# Patient Record
Sex: FEMALE | Race: WHITE | NOT HISPANIC OR LATINO | Employment: STUDENT | URBAN - METROPOLITAN AREA
[De-identification: names, ages, dates, MRNs, and addresses within clinical notes are randomized per-mention and may not be internally consistent; named-entity substitution may affect disease eponyms.]

---
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
## 2025-03-16 ENCOUNTER — OFFICE VISIT (OUTPATIENT)
Dept: URGENT CARE | Facility: URGENT CARE | Age: 20
End: 2025-03-16
Payer: MEDICARE

## 2025-03-16 VITALS
BODY MASS INDEX: 20.62 KG/M2 | HEIGHT: 67 IN | TEMPERATURE: 98.1 F | WEIGHT: 131.39 LBS | RESPIRATION RATE: 16 BRPM | HEART RATE: 100 BPM | OXYGEN SATURATION: 100 % | SYSTOLIC BLOOD PRESSURE: 122 MMHG | DIASTOLIC BLOOD PRESSURE: 78 MMHG

## 2025-03-16 DIAGNOSIS — B37.31 VAGINAL CANDIDIASIS: ICD-10-CM

## 2025-03-16 DIAGNOSIS — R35.0 FREQUENT URINATION: Primary | ICD-10-CM

## 2025-03-16 LAB
POC APPEARANCE, URINE: CLEAR
POC BILIRUBIN, URINE: NEGATIVE
POC BLOOD, URINE: NEGATIVE
POC COLOR, URINE: YELLOW
POC GLUCOSE, URINE: NEGATIVE MG/DL
POC KETONES, URINE: NEGATIVE MG/DL
POC LEUKOCYTES, URINE: NEGATIVE
POC NITRITE,URINE: NEGATIVE
POC PH, URINE: 6.5 PH
POC PROTEIN, URINE: NEGATIVE MG/DL
POC SPECIFIC GRAVITY, URINE: 1.01
POC UROBILINOGEN, URINE: 0.2 EU/DL
PREGNANCY TEST URINE, POC: NEGATIVE

## 2025-03-16 PROCEDURE — 81003 URINALYSIS AUTO W/O SCOPE: CPT | Performed by: NURSE PRACTITIONER

## 2025-03-16 PROCEDURE — 99204 OFFICE O/P NEW MOD 45 MIN: CPT | Performed by: NURSE PRACTITIONER

## 2025-03-16 PROCEDURE — 3008F BODY MASS INDEX DOCD: CPT | Performed by: NURSE PRACTITIONER

## 2025-03-16 PROCEDURE — 1036F TOBACCO NON-USER: CPT | Performed by: NURSE PRACTITIONER

## 2025-03-16 PROCEDURE — 81025 URINE PREGNANCY TEST: CPT | Performed by: NURSE PRACTITIONER

## 2025-03-16 RX ORDER — METHYLPHENIDATE HYDROCHLORIDE 36 MG/1
1 TABLET ORAL
COMMUNITY
Start: 2025-03-13

## 2025-03-16 RX ORDER — FLUCONAZOLE 150 MG/1
150 TABLET ORAL ONCE
Qty: 1 TABLET | Refills: 0 | Status: SHIPPED | OUTPATIENT
Start: 2025-03-16 | End: 2025-03-16

## 2025-03-16 ASSESSMENT — ENCOUNTER SYMPTOMS
HEMATURIA: 0
DIAPHORESIS: 0
BACK PAIN: 0
APPETITE CHANGE: 0
CONFUSION: 0
FLANK PAIN: 0
CHEST TIGHTNESS: 0
ACTIVITY CHANGE: 0
MYALGIAS: 0
NAUSEA: 0
NECK STIFFNESS: 0
LIGHT-HEADEDNESS: 0
HEADACHES: 0
FREQUENCY: 0
DIFFICULTY URINATING: 0
WHEEZING: 0
CHILLS: 0
DYSURIA: 0
NECK PAIN: 0
VOMITING: 0
COUGH: 0
DIZZINESS: 0
FATIGUE: 0
DIARRHEA: 0
ABDOMINAL DISTENTION: 0
FEVER: 0
ABDOMINAL PAIN: 0
PALPITATIONS: 0
SHORTNESS OF BREATH: 0
BLOOD IN STOOL: 0

## 2025-03-16 NOTE — PROGRESS NOTES
"Subjective   Patient ID: Padmaja Alex is a 20 y.o. female. They present today with a chief complaint of No chief complaint on file..    History of Present Illness  Chief complaint: Complaints of possible abscess near the rectal area with vaginal itching externally.  \"Feels like her whole body is swelling\".    HPI: Chart reviewed patient has a history of session with major depressive disorder with severe anxiety and ADHD.  Having said that patient thinks she has an abscess near her rectal area.  Onset 2 days ago.  Also states she feels like    Whole body from head to toe with swelling intermittently over the last 2 days.  Denies for me nausea vomiting or diarrhea.  Told nurse that she has some eye complaints.  No vision changes vision within normal limits.  No pain in the eyes no redness.  No eye injuries.  No abdominal pain no flank pain.  Reports normal intake and output.  No new ingestions or exposures.  No trouble breathing or swallowing no chest pain or shortness of breath.  Appears very anxious.        Nurses notes, vitals and old chart reviewed      History provided by:  Patient   used: No        Past Medical History  Allergies as of 03/16/2025    (No Known Allergies)       (Not in a hospital admission)       Past Medical History:   Diagnosis Date    Absolute anemia 01/23/2023    Cellulitis of left lower limb 12/16/2019    Cellulitis of left lower extremity    Dysautonomia (Multi) 09/24/2022    Microcytic hypochromic anemia 03/02/2022       Past Surgical History:   Procedure Laterality Date    OTHER SURGICAL HISTORY  01/12/2021    No history of surgery            Review of Systems  Review of Systems   Constitutional:  Negative for activity change, appetite change, chills, diaphoresis, fatigue and fever.   HENT: Negative.     Respiratory:  Negative for cough, chest tightness, shortness of breath and wheezing.    Cardiovascular:  Negative for chest pain, palpitations and leg swelling. "   Gastrointestinal:  Negative for abdominal distention, abdominal pain, blood in stool, diarrhea, nausea and vomiting.   Genitourinary:  Negative for decreased urine volume, difficulty urinating, dysuria, flank pain, frequency, hematuria, pelvic pain, urgency, vaginal bleeding, vaginal discharge and vaginal pain.        Vaginal itching  Believes that there is an abscess in her rectal vaginal area   Musculoskeletal:  Negative for back pain, myalgias, neck pain and neck stiffness.   Skin: Negative.    Neurological:  Negative for dizziness, syncope, light-headedness and headaches.   Hematological:         No blood thinners   Psychiatric/Behavioral:  Negative for confusion.                                   Objective    There were no vitals filed for this visit.  No LMP recorded.    Physical Exam  Vitals and nursing note reviewed. Exam conducted with a chaperone present.   Constitutional:       General: She is awake.      Appearance: Normal appearance. She is well-developed and well-groomed. She is not ill-appearing, toxic-appearing or diaphoretic.   HENT:      Mouth/Throat:      Mouth: Mucous membranes are moist.      Pharynx: Oropharynx is clear.   Eyes:      General: No scleral icterus.     Conjunctiva/sclera: Conjunctivae normal.   Cardiovascular:      Pulses: Normal pulses.      Heart sounds: Normal heart sounds.   Pulmonary:      Effort: Pulmonary effort is normal.      Breath sounds: Normal breath sounds.   Abdominal:      Palpations: Abdomen is soft.      Tenderness: There is no abdominal tenderness. There is no right CVA tenderness or left CVA tenderness.   Genitourinary:     Comments: With female chaperone Jeanine present external vaginal exam and rectal exam were done.  Lucas's glands are within normal limits Bartholin's glands negative for abscess or edema.  Vulvar area is mildly erythematous with mild amounts of white coating.  No drainage or bleeding from the introitus.  No perirectal abscesses.  No  hemorrhoids.  No bleeding or drainage no injuries no fissures.  Declined internal exam.  Musculoskeletal:      Comments: Trotter without deficts   Neurological:      Mental Status: She is alert.      GCS: GCS eye subscore is 4. GCS verbal subscore is 5. GCS motor subscore is 6.   Psychiatric:         Attention and Perception: She is inattentive. She does not perceive auditory or visual hallucinations.         Mood and Affect: Mood is anxious.         Speech: Speech is rapid and pressured and tangential.         Behavior: Behavior is hyperactive. Behavior is cooperative.         Thought Content: Thought content is paranoid. Thought content is not delusional. Thought content does not include homicidal or suicidal ideation. Thought content does not include homicidal or suicidal plan.         Cognition and Memory: Cognition and memory normal.         Judgment: Judgment is impulsive.         Procedures    Point of Care Test & Imaging Results from this visit  No results found for this visit on 03/16/25.   No results found.    Diagnostic study results (if any) were reviewed by CHOCO Bethea.    Assessment/Plan   Allergies, medications, history, and pertinent labs/EKGs/Imaging reviewed by CHOCO Bethea.     Medical Decision Making  HPI: SEE NARRATIVE  HISTORIAN: Pateint  INDEPENDENT HISTORIAN:   RECORDS REVIEWED:  DIFFERENTIAL DX: History of obsessive-compulsive, ADHD, major depressive disorder.  Multiple complaints.  Psychosomatic complaints, lower urinary tract infection versus yeast infection versus genital abscess versus perirectal abscess.    LABS: Urinalysis is negative and hCG is negative  XRAY:  EKG:  IN CLINIC MEDICATIONS: NONE  CONSULTED WITH:  DIAGNOSIS: See urgent care course of treatment  SEE URGENT CARE COURSE OF TREATMENT AND DOCUMENTATION FOR RX MEDS GIVEN.   PROCEDURES: SEE PROCEDURE NOTE  Patient and or family were counselled on labs, radiological studies, and all testing applicable for  this visit as well as diagnosis. Patient was discharged homewith stable afebrile non-toxic vital signs. They verbalized discharge instructions that were given to them BOTH written and verbally by myself.  They were given ample time to ask questionsand have none at time of disposition.    Orders and Diagnoses  There are no diagnoses linked to this encounter.    Medical Admin Record      Patient disposition: Home    Electronically signed by CHOCO Bethea  7:19 PM

## 2025-03-16 NOTE — PATIENT INSTRUCTIONS
No abscesses fistulous found on exam  Increase fluids  Eat a well balanced diet   Tylenol or motrin for fevers  Rx medications or over the counter meds as discussed or ordered  Read and follow preprinted instructions  Follow-up with your OB doctor as scheduled  As always you are invited to return if your condition should change or worsen in any way  If your condition worsens or becomes severe or life threatening, please go to the nearest emergency department  Follow up with your family dr in 3 days if no better.

## 2025-03-18 LAB — BACTERIA UR CULT: NORMAL

## 2025-03-20 ENCOUNTER — APPOINTMENT (OUTPATIENT)
Dept: OBSTETRICS AND GYNECOLOGY | Facility: CLINIC | Age: 20
End: 2025-03-20
Payer: MEDICARE

## 2025-03-31 ENCOUNTER — APPOINTMENT (OUTPATIENT)
Dept: OBSTETRICS AND GYNECOLOGY | Facility: CLINIC | Age: 20
End: 2025-03-31
Payer: MEDICARE

## 2025-03-31 VITALS
BODY MASS INDEX: 21.91 KG/M2 | SYSTOLIC BLOOD PRESSURE: 110 MMHG | DIASTOLIC BLOOD PRESSURE: 70 MMHG | HEIGHT: 67 IN | WEIGHT: 139.6 LBS

## 2025-03-31 DIAGNOSIS — N89.8 VAGINAL ITCHING: Primary | ICD-10-CM

## 2025-03-31 DIAGNOSIS — N63.11 MASS OF UPPER OUTER QUADRANT OF RIGHT BREAST: ICD-10-CM

## 2025-03-31 PROBLEM — R01.1 HEART MURMUR: Status: RESOLVED | Noted: 2025-03-31 | Resolved: 2025-03-31

## 2025-03-31 PROBLEM — F42.8 OBSESSIONAL THOUGHTS: Status: ACTIVE | Noted: 2022-02-01

## 2025-03-31 PROBLEM — Q87.40 MARFAN'S SYNDROME: Status: ACTIVE | Noted: 2025-03-31

## 2025-03-31 PROBLEM — G90.A POSTURAL ORTHOSTATIC TACHYCARDIA SYNDROME: Status: ACTIVE | Noted: 2025-02-10

## 2025-03-31 PROBLEM — R35.0 INCREASED FREQUENCY OF URINATION: Status: RESOLVED | Noted: 2025-03-31 | Resolved: 2025-03-31

## 2025-03-31 PROBLEM — F60.5 OBSESSIVE-COMPULSIVE PERSONALITY DISORDER (MULTI): Status: ACTIVE | Noted: 2025-03-31

## 2025-03-31 PROBLEM — N93.9 ABNORMAL UTERINE BLEEDING: Status: RESOLVED | Noted: 2025-03-31 | Resolved: 2025-03-31

## 2025-03-31 PROBLEM — F90.0 ATTENTION DEFICIT HYPERACTIVITY DISORDER (ADHD), PREDOMINANTLY INATTENTIVE TYPE: Status: ACTIVE | Noted: 2024-03-25

## 2025-03-31 PROBLEM — D50.9 IRON DEFICIENCY ANEMIA: Status: ACTIVE | Noted: 2025-03-31

## 2025-03-31 PROCEDURE — 3008F BODY MASS INDEX DOCD: CPT | Performed by: OBSTETRICS & GYNECOLOGY

## 2025-03-31 PROCEDURE — 1036F TOBACCO NON-USER: CPT | Performed by: OBSTETRICS & GYNECOLOGY

## 2025-03-31 PROCEDURE — 99214 OFFICE O/P EST MOD 30 MIN: CPT | Performed by: OBSTETRICS & GYNECOLOGY

## 2025-03-31 ASSESSMENT — ENCOUNTER SYMPTOMS
MUSCULOSKELETAL NEGATIVE: 1
RESPIRATORY NEGATIVE: 0
GASTROINTESTINAL NEGATIVE: 0
CARDIOVASCULAR NEGATIVE: 1
NEUROLOGICAL NEGATIVE: 0
HEMATOLOGIC/LYMPHATIC NEGATIVE: 0
PSYCHIATRIC NEGATIVE: 1
ENDOCRINE NEGATIVE: 0
MUSCULOSKELETAL NEGATIVE: 0
CARDIOVASCULAR NEGATIVE: 0
CONSTITUTIONAL NEGATIVE: 0
NEUROLOGICAL NEGATIVE: 1
PSYCHIATRIC NEGATIVE: 0
GASTROINTESTINAL NEGATIVE: 1
ALLERGIC/IMMUNOLOGIC NEGATIVE: 0
CONSTITUTIONAL NEGATIVE: 1
EYES NEGATIVE: 0
RESPIRATORY NEGATIVE: 1

## 2025-03-31 NOTE — PATIENT INSTRUCTIONS
Vaginal itching, burning, and/or discharge in the Premenopausal Patient:  You were seen in office today for vaginal discharge, itching, burning, and odor.   In premenopausal women these symptoms may be a sign of vaginitis or STD infection, change in vaginal pH, contact irritant/allergic reaction  A BV and yeast culture was sent today, you will be notified of results by phone call/MyChart  STD screening was not done today, you will be notified of results by phone call/MyChart  You can use Aquaphor or A&D ointment for comfort until we get the results of your cultures. Rephresh vaginal gel is a vaginal acidifying agent that can help with pH and can be bought over the counter in most pharmacies. If you have frequent vaginitis symptoms oral probiotics that contain Lactobacillus can sometimes help: Rephresh Pro-B, Honey Pot, Uqora    Continue to abstain from soap/douching products in the vagina, and to use loose fitting cotton underwear  If symptoms continue please call the office or return for reassessment [phone] ([place]) or [phone] ([place])    Gynecologic Visit for Breast Lump:  You were seen today in office for a lump in your breast  Breast lumps may be due to cysts, dense tissue, lactating, and benign or malignant tumors  A mammogram was ordered for you and a breast ultrasound may be done at the same time. Please schedule for your earliest convenience  Further management will depend on imaging findings and may include expectant monitoring, close follow up imaging, or biopsy/removal  You will be notified of all results by [employer]/phone  If you have any issues please call the office at [phone] ([place]) or [phone] [place]

## 2025-03-31 NOTE — PROGRESS NOTES
"Subjective   Patient ID: Padmaja Alex is a 20 y.o. female who presents for Vaginal Itching, Groin Swelling, and Breast Mass (right).  Vaginal Itching  The patient's primary symptoms include vaginal discharge.       Patient presents for vaginal discharge, vaginal itching, and vaginal/vulvar swelling. Patient first noted symptoms 3-4 weeks ago. She states initially she noted a fullness/discomfort and did a self exam. She noted the labia were discolored and swollen. She has had daily vaginal/vulvar itching since this started. She noted a change in her vaginal discharge to a watery discharge that is slightly blood tinged. She was seen at urgent care and given Diflucan. A culture was not done at that time. She did not notice an improvement in her symptoms following Diflucan. She denies using soap in the vulvar area, no new sexual partners.     She separately complains of a right breast lump over the last few weeks. She states the lump is approximately 2-3 cm, mobile, tender. She denies skin discoloration, puckering, nipple discharge. She is uncertain if the lump has changed in size.     Review of Systems   Constitutional: Negative.    Respiratory: Negative.     Cardiovascular: Negative.    Gastrointestinal: Negative.    Genitourinary:  Positive for vaginal discharge.   Musculoskeletal: Negative.    Neurological: Negative.    Psychiatric/Behavioral: Negative.         Objective   Visit Vitals  /70 (BP Location: Left arm, Patient Position: Sitting)   Ht 1.702 m (5' 7\")   Wt 63.3 kg (139 lb 9.6 oz)   LMP 03/05/2025 (Approximate)   BMI 21.86 kg/m²   OB Status Having periods   Smoking Status Never   BSA 1.73 m²       Physical Exam  Constitutional:       Appearance: Normal appearance.   Pulmonary:      Effort: Pulmonary effort is normal.   Chest:   Breasts:     Right: Normal.      Left: Normal.   Genitourinary:     Comments: Vulva normal in appearance without discoloration, rashes, lesions. Vagina is negative for " discharge, lesions. Patient is on menses with moderate menstrual flow noted. Mild hypopigmentation of the labia minora noted.     Musculoskeletal:      Cervical back: Normal range of motion and neck supple.   Lymphadenopathy:      Upper Body:      Right upper body: No supraclavicular, axillary or pectoral adenopathy.      Left upper body: No supraclavicular, axillary or pectoral adenopathy.   Neurological:      Mental Status: She is alert.   Psychiatric:         Mood and Affect: Mood normal.         Behavior: Behavior normal.       Assessment/Plan   Problem List Items Addressed This Visit    None  Visit Diagnoses         Codes    Vaginal itching    -  Primary N89.8    Relevant Orders    Vaginitis Gram Stain For Bacterial Vaginosis + Yeast    Mass of upper outer quadrant of right breast     N63.11          Discussed findings on exam and clinical course.   Breast lump not able to be reproduced, discussed possible cyst/fibrocystic tissue fluctuating with cycle. Breast awareness discussed and red flag exam findings advised  BV/yeast culture sent  Advised suspicion for mild lichen dermatosis. If culture negative will do trial of Clobetasol BID x 2 weeks  Precautions given  Will call with results           Hellen Redman,  03/31/25 2:58 PM

## 2025-04-01 LAB — BV SCORE VAG QL: NORMAL
